# Patient Record
Sex: FEMALE | Race: WHITE
[De-identification: names, ages, dates, MRNs, and addresses within clinical notes are randomized per-mention and may not be internally consistent; named-entity substitution may affect disease eponyms.]

---

## 2021-02-04 ENCOUNTER — HOSPITAL ENCOUNTER (EMERGENCY)
Dept: HOSPITAL 53 - M ED | Age: 21
Discharge: HOME | End: 2021-02-04
Payer: COMMERCIAL

## 2021-02-04 VITALS — DIASTOLIC BLOOD PRESSURE: 70 MMHG | SYSTOLIC BLOOD PRESSURE: 123 MMHG

## 2021-02-04 VITALS — HEIGHT: 63 IN | WEIGHT: 148.15 LBS | BODY MASS INDEX: 26.25 KG/M2

## 2021-02-04 DIAGNOSIS — Z88.8: ICD-10-CM

## 2021-02-04 DIAGNOSIS — M54.5: Primary | ICD-10-CM

## 2021-02-04 DIAGNOSIS — R11.2: ICD-10-CM

## 2021-02-04 DIAGNOSIS — R19.7: ICD-10-CM

## 2021-02-04 DIAGNOSIS — R93.7: ICD-10-CM

## 2021-02-04 LAB
ALBUMIN SERPL BCG-MCNC: 4 GM/DL (ref 3.2–5.2)
ALT SERPL W P-5'-P-CCNC: 26 U/L (ref 12–78)
B-HCG SERPL QL: NEGATIVE
BASOPHILS # BLD AUTO: 0.1 10^3/UL (ref 0–0.2)
BASOPHILS NFR BLD AUTO: 0.5 % (ref 0–1)
BILIRUB CONJ SERPL-MCNC: < 0.1 MG/DL (ref 0–0.2)
BILIRUB SERPL-MCNC: 0.4 MG/DL (ref 0.2–1)
BUN SERPL-MCNC: 9 MG/DL (ref 7–18)
CALCIUM SERPL-MCNC: 9.9 MG/DL (ref 8.5–10.1)
CHLORIDE SERPL-SCNC: 104 MEQ/L (ref 98–107)
CO2 SERPL-SCNC: 25 MEQ/L (ref 21–32)
CREAT SERPL-MCNC: 0.78 MG/DL (ref 0.55–1.3)
EOSINOPHIL # BLD AUTO: 0.1 10^3/UL (ref 0–0.5)
EOSINOPHIL NFR BLD AUTO: 0.9 % (ref 0–3)
GLUCOSE SERPL-MCNC: 77 MG/DL (ref 70–100)
HCT VFR BLD AUTO: 44.9 % (ref 36–47)
HGB BLD-MCNC: 14.8 G/DL (ref 12–15.5)
LIPASE SERPL-CCNC: 58 U/L (ref 73–393)
LYMPHOCYTES # BLD AUTO: 2.2 10^3/UL (ref 1.5–5)
LYMPHOCYTES NFR BLD AUTO: 22.2 % (ref 24–44)
MCH RBC QN AUTO: 28.6 PG (ref 27–33)
MCHC RBC AUTO-ENTMCNC: 33 G/DL (ref 32–36.5)
MCV RBC AUTO: 86.8 FL (ref 80–96)
MONOCYTES # BLD AUTO: 0.4 10^3/UL (ref 0–0.8)
MONOCYTES NFR BLD AUTO: 4.1 % (ref 0–5)
NEUTROPHILS # BLD AUTO: 7.2 10^3/UL (ref 1.5–8.5)
NEUTROPHILS NFR BLD AUTO: 72 % (ref 36–66)
PLATELET # BLD AUTO: 353 10^3/UL (ref 150–450)
POTASSIUM SERPL-SCNC: 6.7 MEQ/L (ref 3.5–5.1)
PROT SERPL-MCNC: 8.8 GM/DL (ref 6.4–8.2)
RBC # BLD AUTO: 5.17 10^6/UL (ref 4–5.4)
SODIUM SERPL-SCNC: 135 MEQ/L (ref 136–145)
WBC # BLD AUTO: 10 10^3/UL (ref 4–10)

## 2021-02-04 NOTE — REP
INDICATION:

R flank pain.



COMPARISON:

Comparison is made with today's KUB..



TECHNIQUE:

Urinary tract sonography.



FINDINGS:

Scanning at the level of the urinary bladder shows no abnormality.  Bilateral emptying

ureteral jets are observed.



Renal cortical echogenicity pattern is normal bilaterally and contours are smooth.



There is no evidence of hydronephrosis, cyst, mass, or calculus in either kidney.



The right kidney measures 10.1 x 4.3 x 3.7 cm.



Left renal dimensions are 9.2 x 4.6 x 4.0 cm.





IMPRESSION:

Normal urinary tract sonography.





<Electronically signed by Bhargav England > 02/04/21 [id number]

## 2021-02-04 NOTE — REP
INDICATION:

R flank paimn radiating to abd.



COMPARISON:

None.



TECHNIQUE:

Supine abdominal film.  KUB.



FINDINGS:

Bowel gas pattern is normal.  Psoas margins and flank stripes are intact.  No mass or

organomegaly.  There is a 7 mm calcification overlying the lower pole of the left

kidney which may be an intrarenal calculus.  Alternatively, this could conceivably be

rib cartilage although it is asymmetric.  No other urinary tract calculus seen.



IMPRESSION:

Normal bowel gas pattern.  Possible 7 mm calcification lower pole left kidney versus

overlying rib cartilage calcification.





<Electronically signed by Bhargav [place] > 02/04/21 [id number]

## 2021-05-18 ENCOUNTER — HOSPITAL ENCOUNTER (OUTPATIENT)
Dept: HOSPITAL 53 - M SMT | Age: 21
End: 2021-05-18
Attending: NURSE PRACTITIONER
Payer: COMMERCIAL

## 2021-05-18 DIAGNOSIS — Z87.440: Primary | ICD-10-CM

## 2021-05-18 LAB
APPEARANCE UR: CLEAR
BACTERIA UR QL AUTO: NEGATIVE
BILIRUB UR QL STRIP.AUTO: NEGATIVE
GLUCOSE UR QL STRIP.AUTO: NEGATIVE MG/DL
HGB UR QL STRIP.AUTO: NEGATIVE
KETONES UR QL STRIP.AUTO: NEGATIVE MG/DL
LEUKOCYTE ESTERASE UR QL STRIP.AUTO: NEGATIVE
MUCOUS THREADS URNS QL MICRO: (no result)
NITRITE UR QL STRIP.AUTO: NEGATIVE
PH UR STRIP.AUTO: 7 UNITS (ref 5–9)
PROT UR QL STRIP.AUTO: NEGATIVE MG/DL
RBC # UR AUTO: 0 /HPF (ref 0–3)
SP GR UR STRIP.AUTO: 1 (ref 1–1.03)
SQUAMOUS #/AREA URNS AUTO: 0 /HPF (ref 0–6)
UROBILINOGEN UR QL STRIP.AUTO: 0.2 MG/DL (ref 0–2)
WBC #/AREA URNS AUTO: 1 /HPF (ref 0–3)

## 2021-05-18 PROCEDURE — 81001 URINALYSIS AUTO W/SCOPE: CPT

## 2021-05-18 PROCEDURE — 87086 URINE CULTURE/COLONY COUNT: CPT

## 2021-05-18 PROCEDURE — 51798 US URINE CAPACITY MEASURE: CPT

## 2021-10-06 ENCOUNTER — HOSPITAL ENCOUNTER (OUTPATIENT)
Dept: HOSPITAL 53 - M LABSMTC | Age: 21
End: 2021-10-06
Attending: PEDIATRICS
Payer: COMMERCIAL

## 2021-10-06 DIAGNOSIS — Z20.822: Primary | ICD-10-CM

## 2023-07-13 ENCOUNTER — HOSPITAL ENCOUNTER (OUTPATIENT)
Dept: HOSPITAL 53 - M SFHCWAGY | Age: 23
End: 2023-07-13
Attending: NURSE PRACTITIONER
Payer: COMMERCIAL

## 2023-07-13 DIAGNOSIS — Z12.4: Primary | ICD-10-CM

## 2023-07-13 DIAGNOSIS — R87.610: ICD-10-CM

## 2023-07-13 PROCEDURE — 87624 HPV HI-RISK TYP POOLED RSLT: CPT
